# Patient Record
Sex: MALE | Race: BLACK OR AFRICAN AMERICAN | Employment: FULL TIME | URBAN - NONMETROPOLITAN AREA
[De-identification: names, ages, dates, MRNs, and addresses within clinical notes are randomized per-mention and may not be internally consistent; named-entity substitution may affect disease eponyms.]

---

## 2020-07-09 ENCOUNTER — HOSPITAL ENCOUNTER (EMERGENCY)
Age: 40
Discharge: HOME | End: 2020-07-09
Attending: EMERGENCY MEDICINE
Payer: COMMERCIAL

## 2020-07-09 VITALS
OXYGEN SATURATION: 97 % | SYSTOLIC BLOOD PRESSURE: 133 MMHG | TEMPERATURE: 98 F | HEART RATE: 68 BPM | DIASTOLIC BLOOD PRESSURE: 88 MMHG | RESPIRATION RATE: 16 BRPM

## 2020-07-09 PROCEDURE — 99203 OFFICE O/P NEW LOW 30 MIN: CPT | Performed by: EMERGENCY MEDICINE

## 2020-07-09 PROCEDURE — 99202 OFFICE O/P NEW SF 15 MIN: CPT

## 2020-07-09 ASSESSMENT — ENCOUNTER SYMPTOMS
DIARRHEA: 0
SHORTNESS OF BREATH: 0
EYE DISCHARGE: 0
TROUBLE SWALLOWING: 0
SORE THROAT: 0
VOICE CHANGE: 0
BACK PAIN: 0
EYE REDNESS: 0
SINUS PRESSURE: 0
ABDOMINAL PAIN: 0
WHEEZING: 0
EYE PAIN: 0
VOMITING: 0
STRIDOR: 0
COUGH: 0
NAUSEA: 0

## 2020-07-09 NOTE — ED PROVIDER NOTES
MEDICATIONS       Previous Medications    No medications on file       ALLERGIES     Patient is has No Known Allergies. FAMILY HISTORY     Patient'sfamily history includes High Blood Pressure in his father and mother. SOCIAL HISTORY     Patient  reports that he has been smoking cigars. He has never used smokeless tobacco. He reports current alcohol use. PHYSICAL EXAM     ED TRIAGE VITALS  BP: 133/88, Temp: 98 °F (36.7 °C), Pulse: 68, Resp: 16, SpO2: 97 %  Physical Exam  Vitals signs and nursing note reviewed. Constitutional:       Appearance: He is well-developed. Comments: Moist membranes, normal airway   HENT:      Head: Normocephalic and atraumatic. Right Ear: External ear normal.      Left Ear: External ear normal.      Nose: Nose normal. No congestion or rhinorrhea. Right Sinus: No maxillary sinus tenderness or frontal sinus tenderness. Left Sinus: No maxillary sinus tenderness or frontal sinus tenderness. Mouth/Throat:      Pharynx: No oropharyngeal exudate. Comments: Cavity normal.  Upper lip corner of mouth reveals what appears to be a pyogenic granuloma. No abscess. Eyes:      General: No scleral icterus. Right eye: No discharge. Left eye: No discharge. Extraocular Movements:      Right eye: Normal extraocular motion. Left eye: Normal extraocular motion. Conjunctiva/sclera: Conjunctivae normal.      Pupils: Pupils are equal, round, and reactive to light. Comments: Conjunctival clear   Neck:      Musculoskeletal: Normal range of motion. Thyroid: No thyromegaly. Vascular: No JVD. Comments: No meningismus  Cardiovascular:      Rate and Rhythm: Normal rate and regular rhythm. Pulses: Normal pulses. Heart sounds: Normal heart sounds, S1 normal and S2 normal. No murmur. No friction rub. No gallop. Pulmonary:      Effort: Pulmonary effort is normal. No tachypnea or respiratory distress.       Breath sounds: Normal breath sounds. No stridor. No decreased breath sounds, wheezing, rhonchi or rales. Comments: No Cough, lungs clear  Chest:      Chest wall: No tenderness. Abdominal:      General: Bowel sounds are normal. There is no distension. Palpations: Abdomen is soft. There is no mass. Tenderness: There is no abdominal tenderness. There is no guarding or rebound. Musculoskeletal: Normal range of motion. General: No tenderness. Comments: Joints and extremities normal   Lymphadenopathy:      Cervical: No cervical adenopathy. Right cervical: No superficial or deep cervical adenopathy. Left cervical: No superficial or deep cervical adenopathy. Skin:     General: Skin is warm and dry. Findings: No erythema or rash. Comments: Lesion corner of the mouth upper lip most consistent with pyogenic granuloma   Neurological:      Mental Status: He is alert and oriented to person, place, and time. Cranial Nerves: No cranial nerve deficit. Motor: No abnormal muscle tone. Coordination: Coordination normal.      Deep Tendon Reflexes: Reflexes are normal and symmetric. Reflexes normal.      Comments: Appropriate, no focal findings   Psychiatric:         Behavior: Behavior normal.         Thought Content: Thought content normal.         Judgment: Judgment normal.         DIAGNOSTIC RESULTS   Labs: No results found for this visit on 07/09/20. IMAGING:  No orders to display     URGENT CARE COURSE:     Vitals:    07/09/20 1640   BP: 133/88   Pulse: 68   Resp: 16   Temp: 98 °F (36.7 °C)   TempSrc: Infrared   SpO2: 97%       Medications - No data to display  PROCEDURES:  None  FINALIMPRESSION      1. Granuloma pyogenicum    2. Pyoderma (skin infection)    3. Tobacco use disorder        DISPOSITION/PLAN   DISPOSITION Decision To Discharge 07/09/2020 05:27:15 PM  Nontoxic, well-hydrated, normal airway. Patient has what appears to be pyogenic granuloma.   Will treat with

## 2020-07-09 NOTE — ED NOTES
Patient understood instructions verbally,  Follow up with PCP with any concerns,  Prescription, work excuse with patient. ambulated self to lobby,stable condition.       Esme Metz LPN  12/37/65 [id number]

## 2020-07-09 NOTE — LETTER
[address] [facility]  [address] [place] [address]  Phone: [phone]               July 9, 2020    Patient: Miryam Dean   YOB: 1980   Date of Visit: 7/9/2020       To Whom It May Concern:    Miryam Dean was seen and treated in our emergency department on 7/9/2020. He may return to work on 7/14/2020.   No work July 13, 2020      Sincerely,       Carlota Mccabe MD         Signature:__________________________________

## 2020-07-13 ENCOUNTER — OFFICE VISIT (OUTPATIENT)
Dept: FAMILY MEDICINE CLINIC | Age: 40
End: 2020-07-13
Payer: COMMERCIAL

## 2020-07-13 VITALS
TEMPERATURE: 97.3 F | SYSTOLIC BLOOD PRESSURE: 116 MMHG | OXYGEN SATURATION: 99 % | WEIGHT: 218.6 LBS | HEIGHT: 73 IN | DIASTOLIC BLOOD PRESSURE: 78 MMHG | RESPIRATION RATE: 10 BRPM | HEART RATE: 78 BPM | BODY MASS INDEX: 28.97 KG/M2

## 2020-07-13 PROCEDURE — 99203 OFFICE O/P NEW LOW 30 MIN: CPT | Performed by: STUDENT IN AN ORGANIZED HEALTH CARE EDUCATION/TRAINING PROGRAM

## 2020-07-13 PROCEDURE — 11310 SHAVE SKIN LESION 0.5 CM/<: CPT | Performed by: STUDENT IN AN ORGANIZED HEALTH CARE EDUCATION/TRAINING PROGRAM

## 2020-07-13 RX ORDER — LIDOCAINE HYDROCHLORIDE AND EPINEPHRINE 10; 10 MG/ML; UG/ML
20 INJECTION, SOLUTION INFILTRATION; PERINEURAL ONCE
Status: COMPLETED | OUTPATIENT
Start: 2020-07-13 | End: 2020-07-13

## 2020-07-13 RX ADMIN — LIDOCAINE HYDROCHLORIDE AND EPINEPHRINE 20 ML: 10; 10 INJECTION, SOLUTION INFILTRATION; PERINEURAL at 15:00

## 2020-07-13 SDOH — ECONOMIC STABILITY: TRANSPORTATION INSECURITY
IN THE PAST 12 MONTHS, HAS THE LACK OF TRANSPORTATION KEPT YOU FROM MEDICAL APPOINTMENTS OR FROM GETTING MEDICATIONS?: NO

## 2020-07-13 SDOH — ECONOMIC STABILITY: FOOD INSECURITY: WITHIN THE PAST 12 MONTHS, YOU WORRIED THAT YOUR FOOD WOULD RUN OUT BEFORE YOU GOT MONEY TO BUY MORE.: SOMETIMES TRUE

## 2020-07-13 SDOH — ECONOMIC STABILITY: FOOD INSECURITY: WITHIN THE PAST 12 MONTHS, THE FOOD YOU BOUGHT JUST DIDN'T LAST AND YOU DIDN'T HAVE MONEY TO GET MORE.: SOMETIMES TRUE

## 2020-07-13 SDOH — ECONOMIC STABILITY: INCOME INSECURITY: HOW HARD IS IT FOR YOU TO PAY FOR THE VERY BASICS LIKE FOOD, HOUSING, MEDICAL CARE, AND HEATING?: NOT VERY HARD

## 2020-07-13 SDOH — ECONOMIC STABILITY: TRANSPORTATION INSECURITY
IN THE PAST 12 MONTHS, HAS LACK OF TRANSPORTATION KEPT YOU FROM MEETINGS, WORK, OR FROM GETTING THINGS NEEDED FOR DAILY LIVING?: NO

## 2020-07-13 ASSESSMENT — ENCOUNTER SYMPTOMS
DIARRHEA: 0
SINUS PAIN: 0
SHORTNESS OF BREATH: 0
SINUS PRESSURE: 0
NAUSEA: 0
ABDOMINAL PAIN: 0
COUGH: 0
BACK PAIN: 0
CONSTIPATION: 0
VOMITING: 0
EYE PAIN: 0
SORE THROAT: 0
BLOOD IN STOOL: 0

## 2020-07-13 ASSESSMENT — PATIENT HEALTH QUESTIONNAIRE - PHQ9
SUM OF ALL RESPONSES TO PHQ9 QUESTIONS 1 & 2: 0
2. FEELING DOWN, DEPRESSED OR HOPELESS: 0
SUM OF ALL RESPONSES TO PHQ QUESTIONS 1-9: 0
1. LITTLE INTEREST OR PLEASURE IN DOING THINGS: 0
SUM OF ALL RESPONSES TO PHQ QUESTIONS 1-9: 0

## 2020-07-13 NOTE — PROGRESS NOTES
S: 41 y.o. male with   Chief Complaint   Patient presents with    Follow-up     ed- issue with growth on corner of mouth, denies pain       Tried to pop a pimple and it got worse - the urgent care gave bacitracin and it has not gotten smaller - but larger    It came on suddenly and does not remember using anything new - uses carmex - not irritated but some pain    No other symptoms    Smokes cigars    BP Readings from Last 3 Encounters:   07/13/20 116/78   07/09/20 133/88     Wt Readings from Last 3 Encounters:   07/13/20 218 lb 9.6 oz (99.2 kg)           O: VS:   Vitals:    07/13/20 1336   BP: 116/78   Site: Left Upper Arm   Position: Sitting   Cuff Size: Large Adult   Pulse: 78   Resp: 10   Temp: 97.3 °F (36.3 °C)   TempSrc: Temporal   SpO2: 99%   Weight: 218 lb 9.6 oz (99.2 kg)   Height: 6' 1\" (1.854 m)     Body mass index is 28.84 kg/m². AAO/NAD, appropriate affect for mood  Normocephalic, atraumatic, eyes - conjunctiva and sclera normal,   skin no rashes on exposed areas   Insight, judgement normal and in no acute distress      No results found for: WBC, HGB, HCT, PLT, CHOL, TRIG, HDL, LDLDIRECT, LDLCALC, AST, NA, K, CL, CREATININE, BUN, CO2, TSH, PSA, INR, GLUF, LABA1C, LABMICR, LABGLOM, MG, CALCIUM, VITD25    No results found. Diagnosis Orders   1. Lesion of lip  Basic Metabolic Panel    CBC With Auto Differential    lidocaine-EPINEPHrine 1 percent-1:953063 injection 20 mL    Surgical Pathology    [address] - [place] SHAV SKIN LES <5MM FACE,FACIAL   2. Encounter for screening for lipoid disorders  Lipid Panel   3.  Screening for HIV without presence of risk factors  HIV-1 and HIV-2 Antibodies       Plan  Will bring you back for the new patient and go over labs    Can do a cbc, hiv, cholesterol and liver and kidneys    Observed the entire procedure with the resident Tessa Fernandez - minimal bleeding applied aluminum chloride to help the healing and bleeding    Suspected wart will send for pathology Return in about 1 month (around 8/13/2020) for new patient discuss HM, labs. Orders Placed:  Orders Placed This Encounter   Procedures    Lipid Panel    HIV-1 and HIV-2 Antibodies    Basic Metabolic Panel    CBC With Auto Differential    Surgical Pathology    [address] - [place] SHAV SKIN LES <5MM FACE,FACIAL     Medications Prescribed:  Orders Placed This Encounter   Medications    lidocaine-EPINEPHrine 1 percent-1:906629 injection 20 mL       Future Appointments   Date Time Provider Gisela Lorena   8/20/2020  4:10 PM Bobby Chandra MD [address] [facility] Maintenance Due   Topic Date Due    Pneumococcal 0-64 years Vaccine (1 of 1 - PPSV23) 05/30/1984    HIV screen  05/30/1993    DTaP/Tdap/Td vaccine (1 - Tdap) 05/30/1997    Lipid screen  05/30/2018    Diabetes screen  05/30/2018         Attending Physician Statement  I have discussed the case, including pertinent history and exam findings with the resident. I also have seen the patient and performed key portions of the examination. I agree with the documented assessment and plan as documented by the resident.   GE modifier added to this encounter      Yao Em DO 7/13/2020 5:07 PM

## 2020-07-13 NOTE — PATIENT INSTRUCTIONS
Thank you   1. Thank you for trusting us with your healthcare needs. You may receive a survey regarding today's visit. It would help us out if you would take a few moments to provide your feedback. We value your input. 2. Please bring in ALL medications BOTTLES, including inhalers, herbal supplements, over the counter, prescribed & non-prescribed medicine. The office would like actual medication bottles and a list.   3. Please note our OFFICE POLICIES:   a. Prior to getting your labs drawn, please check with your insurance company for benefits and eligibility of lab services. Often, insurance companies cover certain tests for preventative visits only. It is patient's responsibility to see what is covered. b. We are unable to change a diagnosis after the test has been performed. c. Lab orders will not be re-printed. Please hold onto your original lab orders and take them to your lab to be completed. d. If you no show your scheduled appointment three times, you will be dismissed from this practice. e. Suezamye Jenna must be completed 24 hours prior to your schedule appointment. 4. If the list below has been completed, PLEASE FAX RECORDS TO OUR OFFICE @ [phone]. Once the records have been received we will update your records at our office:  Health Maintenance Due   Topic Date Due    Pneumococcal 0-64 years Vaccine (1 of 1 - PPSV23) 05/30/1984    HIV screen  05/30/1993    DTaP/Tdap/Td vaccine (1 - Tdap) 05/30/1997    Lipid screen  05/30/2018             Patient Education        Stopping Smokeless Tobacco Use: Care Instructions  Your Care Instructions     Smokeless tobacco comes in many forms, such as snuff and chewing tobacco:  · Snuff is finely ground tobacco sold in cans or pouches. Most of the time, snuff is used by putting a \"pinch\" or \"dip\" between the lower lip or cheek and the gum. · Chewing tobacco is sold as loose leaves, plugs, or twists.  It is chewed or placed between the cheek and the gum feel better. Follow-up care is a key part of your treatment and safety. Be sure to make and go to all appointments, and call your doctor if you are having problems. It's also a good idea to know your test results and keep a list of the medicines you take. How can you care for yourself at home? · Ask your family, friends, and coworkers for support. You have a better chance of quitting if you have help and support. · Join a support group for people who are trying to quit using smokeless tobacco.  · Set a quit date. Pick your date carefully so that it is not right in the middle of a big deadline or stressful time. After you quit, do not use smokeless tobacco even once. Get rid of all spit cups, cans, and pouches after your last use. Clean your house and your clothes so that they do not smell of tobacco.  · Learn how to be a non-user. Think about ways you can avoid those things that make you reach for tobacco.  ? Learn some ways to deal with cravings, like calling a friend or going for a walk. Cravings often pass. ? Avoid situations that put you at greatest risk for using smokeless tobacco. For some people, it is hard to spend time with friends without dipping or chewing. For others, they might skip a coffee break with coworkers who smoke or use smokeless tobacco.  ? Change your daily routine. Take a different route to work, or eat a meal in a different place. · Cut down on stress. Calm yourself or release tension by doing an activity you enjoy, such as reading a book, taking a hot bath, or gardening. · Talk to your doctor or pharmacist about nicotine replacement therapy. You still get nicotine, but you do not use tobacco. Nicotine replacement products help you slowly reduce the amount of nicotine you need. Many of these products are available over the counter. They include nicotine patches, gum, lozenges, and inhalers.   · Ask your doctor about bupropion (Wellbutrin) or varenicline (Chantix), which are

## 2020-07-13 NOTE — PROGRESS NOTES
[address] [address] [facility] [address]  Dept: [phone]  Dept Fax: [phone]  Loc: [phone]    Nely Yoder is a 41 y.o. male who presents today for:  Chief Complaint   Patient presents with    Follow-up     ed- issue with growth on corner of mouth, denies pain       HPI:   New patient, here to establish care today. Follow-up from ED. Lesion on the corner of his mouth, started suddenly about 2 weeks ago. Tried to pop it, started bleeding. Tried using alcohol and hydrogen peroxide at home. Went to  and they just gave him Bactroban ointment. Does not hurt, but feels it is still growing. Denies fever, rashes, other oral lesions. Denies any PMHx, Surgican Hx, allergies. Reports he smokes cigars, denies any tobacco use. No past medical history on file. No past surgical history on file. Family History   Problem Relation Age of Onset    High Blood Pressure Mother     High Blood Pressure Father      Social History     Tobacco Use    Smoking status: Current Some Day Smoker     Types: Cigars    Smokeless tobacco: Never Used    Tobacco comment: occ   Substance Use Topics    Alcohol use: Yes     Comment: occ      Current Outpatient Medications   Medication Sig Dispense Refill    mupirocin (BACTROBAN) 2 % ointment Apply topically 3 times daily.  22 g 0     Current Facility-Administered Medications   Medication Dose Route Frequency Provider Last Rate Last Dose    lidocaine-EPINEPHrine 1 percent-1:157569 injection 20 mL  20 mL Other Once Cindy Bustamante MD         No Known Allergies    Health Maintenance   Topic Date Due    Pneumococcal 0-64 years Vaccine (1 of 1 - PPSV23) 05/30/1984    HIV screen  05/30/1993    DTaP/Tdap/Td vaccine (1 - Tdap) 05/30/1997    Lipid screen  05/30/2018    Diabetes screen  05/30/2018    Flu vaccine (1) 09/01/2020    Hepatitis A vaccine  Aged Out    Hepatitis B vaccine  Aged Out    Hib vaccine  Aged Out    Meningococcal (ACWY) vaccine  Aged Out       Subjective:      Review of Systems   Constitutional: Negative for fatigue and fever. HENT: Negative for sinus pressure, sinus pain and sore throat. Eyes: Negative for pain and visual disturbance. Respiratory: Negative for cough and shortness of breath. Cardiovascular: Negative for chest pain and palpitations. Gastrointestinal: Negative for abdominal pain, blood in stool, constipation, diarrhea, nausea and vomiting. Genitourinary: Negative for difficulty urinating and hematuria. Musculoskeletal: Negative for back pain and joint swelling. Skin: Positive for wound. Negative for rash. Neurological: Negative for dizziness, light-headedness and headaches. Psychiatric/Behavioral: Negative for confusion and sleep disturbance. Objective:     Vitals:    07/13/20 1336   BP: 116/78   Site: Left Upper Arm   Position: Sitting   Cuff Size: Large Adult   Pulse: 78   Resp: 10   Temp: 97.3 °F (36.3 °C)   TempSrc: Temporal   SpO2: 99%   Weight: 218 lb 9.6 oz (99.2 kg)   Height: 6' 1\" (1.854 m)       Body mass index is 28.84 kg/m². Wt Readings from Last 3 Encounters:   07/13/20 218 lb 9.6 oz (99.2 kg)     BP Readings from Last 3 Encounters:   07/13/20 116/78   07/09/20 133/88       Physical Exam  Vitals signs and nursing note reviewed. Constitutional:       General: He is not in acute distress. Appearance: Normal appearance. He is well-developed. He is not diaphoretic. HENT:      Head: Normocephalic and atraumatic. Right Ear: External ear normal.      Left Ear: External ear normal.      Nose: Nose normal.      Mouth/Throat:      Mouth: Mucous membranes are moist.   Eyes:      General: No scleral icterus. Right eye: No discharge. Left eye: No discharge. Conjunctiva/sclera: Conjunctivae normal.      Pupils: Pupils are equal, round, and reactive to light.    Neck: Musculoskeletal: Normal range of motion. Cardiovascular:      Rate and Rhythm: Normal rate and regular rhythm. Pulses: Normal pulses. Heart sounds: Normal heart sounds. No murmur. No friction rub. Pulmonary:      Effort: Pulmonary effort is normal. No respiratory distress. Breath sounds: Normal breath sounds. No wheezing. Abdominal:      General: Bowel sounds are normal. There is no distension. Palpations: Abdomen is soft. There is no mass. Tenderness: There is no abdominal tenderness. Musculoskeletal: Normal range of motion. Right lower leg: No edema. Left lower leg: No edema. Skin:     General: Skin is warm and dry. Findings: Lesion present. No erythema or rash. Comments: Lower right lip with pedunculated lesion   Neurological:      Mental Status: He is alert and oriented to person, place, and time. Cranial Nerves: No cranial nerve deficit. Psychiatric:         Mood and Affect: Mood normal.         Behavior: Behavior normal.         Thought Content: Thought content normal.         Judgment: Judgment normal.               No results found for: WBC, HGB, HCT, PLT, CHOL, TRIG, HDL, LDLDIRECT, LDLCALC, AST, NA, K, CL, CREATININE, BUN, CO2, TSH, PSA, INR, GLUF, LABA1C, LABMICR, LABGLOM, MG, CALCIUM, VITD25    Imaging Results:    No results found. Assessment / Plan:     Dominick Crandall was seen today for follow-up. Diagnoses and all orders for this visit:    Lesion of lip  -     Basic Metabolic Panel; Future  -     CBC With Auto Differential; Future  -     lidocaine-EPINEPHrine 1 percent-1:252642 injection 20 mL  -     Surgical Pathology  -     [address]36 - [place] SHAV SKIN LES <5MM FACE,FACIAL    Excision of Lesion Procedure Note    Lesions: 1    Procedure: The lesion was cleaned with alcohol and draped in the usual way. Under sterile conditions 1% lidocaine with epinephrine was used for local anesthesia.   A number 11 scalpel was used to excise the lesion with 5mm margins and full thickness. The margins were undermined to obtain good approximation of the margins. The wound was cleansed with peroxide and alcohol and hemostasis achieved. No sutures were placed with good wound tension. The wound was cleansed with alcohol and dressed in a pressure dressing. No complications occurred and the patient tolerated the procedure well. The patient was instructed on wound care. Encounter for screening for lipoid disorders  -     Lipid Panel; Future    Screening for HIV without presence of risk factors  -     HIV-1 and HIV-2 Antibodies; Future      Lip lesion removed in office today, will send to Pathology  Basic labs for next visit  Discussed HM - will elaborate at follow-up next month       Return in about 1 month (around 8/13/2020) for new patient discuss HM, labs. Medications Prescribed:  Orders Placed This Encounter   Medications    lidocaine-EPINEPHrine 1 percent-1:888578 injection 20 mL       Future Appointments   Date Time Provider Gisela Carrillo   7/13/2020  3:40 PM Theresa Medrano MD [address]   8/20/2020  4:10 PM Theresa Medrano MD [address]       Patient given educational materials - see patient instructions. Discussed use, benefit, and sideeffects of prescribed medications. All patient questions answered. Pt voiced understanding. Reviewed health maintenance. Instructed to continue current medications, diet and exercise. Patient agreed with treatment plan. Follow up as directed.      Electronically signed by Theresa Medrano MD on 7/13/2020 at 2:49 PM

## 2020-07-30 ENCOUNTER — TELEPHONE (OUTPATIENT)
Dept: FAMILY MEDICINE CLINIC | Age: 40
End: 2020-07-30

## 2020-07-30 NOTE — TELEPHONE ENCOUNTER
Dr. Katharina Foster -    Patient stated that his lip still has a bump to it and is red. Also feels hard. It does not hurt and did not mention that it was warm to touch. Please Advise. Thanks!   Mc

## 2020-08-07 ENCOUNTER — OFFICE VISIT (OUTPATIENT)
Dept: FAMILY MEDICINE CLINIC | Age: 40
End: 2020-08-07
Payer: COMMERCIAL

## 2020-08-07 ENCOUNTER — NURSE ONLY (OUTPATIENT)
Dept: LAB | Age: 40
End: 2020-08-07

## 2020-08-07 VITALS
HEIGHT: 73 IN | SYSTOLIC BLOOD PRESSURE: 126 MMHG | DIASTOLIC BLOOD PRESSURE: 84 MMHG | OXYGEN SATURATION: 97 % | HEART RATE: 64 BPM | WEIGHT: 216 LBS | TEMPERATURE: 97.2 F | BODY MASS INDEX: 28.63 KG/M2

## 2020-08-07 LAB
ANION GAP SERPL CALCULATED.3IONS-SCNC: 9 MEQ/L (ref 8–16)
BASOPHILS # BLD: 0.3 %
BASOPHILS ABSOLUTE: 0 THOU/MM3 (ref 0–0.1)
BUN BLDV-MCNC: 15 MG/DL (ref 7–22)
CALCIUM SERPL-MCNC: 9.3 MG/DL (ref 8.5–10.5)
CHLORIDE BLD-SCNC: 105 MEQ/L (ref 98–111)
CHOLESTEROL, TOTAL: 210 MG/DL (ref 100–199)
CO2: 26 MEQ/L (ref 23–33)
CREAT SERPL-MCNC: 1.1 MG/DL (ref 0.4–1.2)
EOSINOPHIL # BLD: 2.8 %
EOSINOPHILS ABSOLUTE: 0.1 THOU/MM3 (ref 0–0.4)
ERYTHROCYTE [DISTWIDTH] IN BLOOD BY AUTOMATED COUNT: 12.4 % (ref 11.5–14.5)
ERYTHROCYTE [DISTWIDTH] IN BLOOD BY AUTOMATED COUNT: 40 FL (ref 35–45)
GFR SERPL CREATININE-BSD FRML MDRD: 73 ML/MIN/1.73M2
GLUCOSE BLD-MCNC: 105 MG/DL (ref 70–108)
HCT VFR BLD CALC: 42.9 % (ref 42–52)
HDLC SERPL-MCNC: 65 MG/DL
HEMOGLOBIN: 14.4 GM/DL (ref 14–18)
IMMATURE GRANS (ABS): 0 THOU/MM3 (ref 0–0.07)
IMMATURE GRANULOCYTES: 0 %
LDL CHOLESTEROL CALCULATED: 127 MG/DL
LYMPHOCYTES # BLD: 43.5 %
LYMPHOCYTES ABSOLUTE: 1.4 THOU/MM3 (ref 1–4.8)
MCH RBC QN AUTO: 29.6 PG (ref 26–33)
MCHC RBC AUTO-ENTMCNC: 33.6 GM/DL (ref 32.2–35.5)
MCV RBC AUTO: 88.1 FL (ref 80–94)
MONOCYTES # BLD: 10.1 %
MONOCYTES ABSOLUTE: 0.3 THOU/MM3 (ref 0.4–1.3)
NUCLEATED RED BLOOD CELLS: 0 /100 WBC
PLATELET # BLD: 186 THOU/MM3 (ref 130–400)
PMV BLD AUTO: 11.6 FL (ref 9.4–12.4)
POTASSIUM SERPL-SCNC: 4 MEQ/L (ref 3.5–5.2)
RBC # BLD: 4.87 MILL/MM3 (ref 4.7–6.1)
SEG NEUTROPHILS: 43.3 %
SEGMENTED NEUTROPHILS ABSOLUTE COUNT: 1.4 THOU/MM3 (ref 1.8–7.7)
SODIUM BLD-SCNC: 140 MEQ/L (ref 135–145)
TRIGL SERPL-MCNC: 90 MG/DL (ref 0–199)
WBC # BLD: 3.2 THOU/MM3 (ref 4.8–10.8)

## 2020-08-07 PROCEDURE — 99212 OFFICE O/P EST SF 10 MIN: CPT | Performed by: STUDENT IN AN ORGANIZED HEALTH CARE EDUCATION/TRAINING PROGRAM

## 2020-08-07 ASSESSMENT — ENCOUNTER SYMPTOMS
BACK PAIN: 0
BLOOD IN STOOL: 0
NAUSEA: 0
SINUS PAIN: 0
CONSTIPATION: 0
SORE THROAT: 0
EYE PAIN: 0
ABDOMINAL PAIN: 0
SINUS PRESSURE: 0
VOMITING: 0
COUGH: 0
SHORTNESS OF BREATH: 0
DIARRHEA: 0

## 2020-08-07 NOTE — PROGRESS NOTES
S: 41 y.o. male with   Chief Complaint   Patient presents with    Follow-up     LIP       The lip lesion is growing back again    Here for pathology    BP Readings from Last 3 Encounters:   08/07/20 126/84   07/13/20 116/78   07/09/20 133/88     Wt Readings from Last 3 Encounters:   08/07/20 216 lb (98 kg)   07/13/20 218 lb 9.6 oz (99.2 kg)           O: VS:   Vitals:    08/07/20 1041   BP: 126/84   Pulse: 64   Temp: 97.2 °F (36.2 °C)   TempSrc: Temporal   SpO2: 97%   Weight: 216 lb (98 kg)   Height: 6' 1\" (1.854 m)     Body mass index is 28.5 kg/m². AAO/NAD, appropriate affect for mood  Normocephalic, atraumatic, eyes - conjunctiva and sclera normal,   skin no rashes on exposed areas   Insight, judgement normal and in no acute distress    Dermatoscope shows red lesion with blood vessels noted 3 to 4 mm at corner of right lower lip    No results found for: WBC, HGB, HCT, PLT, CHOL, TRIG, HDL, LDLDIRECT, LDLCALC, AST, NA, K, CL, CREATININE, BUN, CO2, TSH, PSA, INR, GLUF, LABA1C, LABMICR, LABGLOM, MG, CALCIUM, VITD25    No results found. Diagnosis Orders   1. Hemangioma of lip  Quyen Mccarthy MD, Plastic Surgery, [facility]  Will refer to plastics - suspect hemangioma - and will need to do more at the base of the lesion so it will  Not grow back       Return in about 1 month (around 9/7/2020). Orders Placed:  Orders Placed This Encounter   Procedures   Suzy Rosa MD, [facility] [place]     Medications Prescribed:  No orders of the defined types were placed in this encounter.       Future Appointments   Date Time Provider Gisela Carrillo   8/7/2020 11:30 AM SCHEDULE, [facility] RM 1 [facility] [address] [address]   8/27/2020  2:20 PM Mili Ferrer MD [address] [facility] - [facility]el[facility] Thompson Maintenance Due   Topic Date Due    Pneumococcal 0-64 years Vaccine (1 of 1 - PPSV23) 05/30/1984    HIV screen  05/30/1993    DTaP/Tdap/Td vaccine (1 - Tdap) 05/30/1997    Lipid screen

## 2020-08-07 NOTE — PATIENT INSTRUCTIONS
Thank you   1. Thank you for trusting us with your healthcare needs. You may receive a survey regarding today's visit. It would help us out if you would take a few moments to provide your feedback. We value your input. 2. Please bring in ALL medications BOTTLES, including inhalers, herbal supplements, over the counter, prescribed & non-prescribed medicine. The office would like actual medication bottles and a list.   3. Please note our OFFICE POLICIES:   a. Prior to getting your labs drawn, please check with your insurance company for benefits and eligibility of lab services. Often, insurance companies cover certain tests for preventative visits only. It is patient's responsibility to see what is covered. b. We are unable to change a diagnosis after the test has been performed. c. Lab orders will not be re-printed. Please hold onto your original lab orders and take them to your lab to be completed. d. If you no show your scheduled appointment three times, you will be dismissed from this practice. e. Ralf Jessica must be completed 24 hours prior to your schedule appointment. 4. If the list below has been completed, PLEASE FAX RECORDS TO OUR OFFICE @ [phone]. Once the records have been received we will update your records at our office:  Health Maintenance Due   Topic Date Due    Pneumococcal 0-64 years Vaccine (1 of 1 - PPSV23) 05/30/1984    HIV screen  05/30/1993    DTaP/Tdap/Td vaccine (1 - Tdap) 05/30/1997    Lipid screen  05/30/2018    Diabetes screen  05/30/2018           Thank you   4. Thank you for trusting us with your healthcare needs. You may receive a survey regarding today's visit. It would help us out if you would take a few moments to provide your feedback. We value your input. 5. Please bring in ALL medications BOTTLES, including inhalers, herbal supplements, over the counter, prescribed & non-prescribed medicine.  The office would like actual medication bottles and a list. 6. Please note our OFFICE POLICIES:   f. Prior to getting your labs drawn, please check with your insurance company for benefits and eligibility of lab services. Often, insurance companies cover certain tests for preventative visits only. It is patient's responsibility to see what is covered. g. We are unable to change a diagnosis after the test has been performed.   h. Lab orders will not be re-printed. Please hold onto your original lab orders and take them to your lab to be completed. i. If you no show your scheduled appointment three times, you will be dismissed from this practice.   Maldonado Melena must be completed 24 hours prior to your schedule appointment. 5. If the list below has been completed, PLEASE FAX RECORDS TO OUR OFFICE @ [phone].  Once the records have been received we will update your records at our office:  Health Maintenance Due   Topic Date Due    Pneumococcal 0-64 years Vaccine (1 of 1 - PPSV23) 05/30/1984    HIV screen  05/30/1993    DTaP/Tdap/Td vaccine (1 - Tdap) 05/30/1997    Lipid screen  05/30/2018    Diabetes screen  05/30/2018

## 2020-08-07 NOTE — PROGRESS NOTES
[id number] [facility] [place] [address] [address]  Dept: [phone]  Dept Fax: [phone]  Loc: [phone]    Balta Lara is a 41 y.o. male who presents today for:  Chief Complaint   Patient presents with    Follow-up     LIP       HPI:   Here for follow-up for lip lesion. Excised at the last appt, had some bleeding afterwards but stopped. Denies pain to the lesion. States that the lesion is just ugly to look at and would like to have it removed. Lesion is now red. Surgical pathology only revealed inflammatory debris. Denies any history of herpes. No past medical history on file. No past surgical history on file. Family History   Problem Relation Age of Onset    High Blood Pressure Mother     High Blood Pressure Father      Social History     Tobacco Use    Smoking status: Current Some Day Smoker     Types: Cigars    Smokeless tobacco: Never Used    Tobacco comment: occ   Substance Use Topics    Alcohol use: Yes     Comment: occ      No current outpatient medications on file. No current facility-administered medications for this visit. No Known Allergies    Health Maintenance   Topic Date Due    Pneumococcal 0-64 years Vaccine (1 of 1 - PPSV23) 05/30/1984    HIV screen  05/30/1993    DTaP/Tdap/Td vaccine (1 - Tdap) 05/30/1997    Lipid screen  05/30/2018    Diabetes screen  05/30/2018    Flu vaccine (1) 09/01/2020    Hepatitis A vaccine  Aged Out    Hepatitis B vaccine  Aged Out    Hib vaccine  Aged Out    Meningococcal (ACWY) vaccine  Aged Out       Subjective:      Review of Systems   Constitutional: Negative for fatigue and fever. HENT: Negative for sinus pressure, sinus pain and sore throat. Eyes: Negative for pain and visual disturbance. Respiratory: Negative for cough and shortness of breath. Cardiovascular: Negative for chest pain and palpitations.    Gastrointestinal: Negative for abdominal pain, blood in stool, constipation, diarrhea, nausea and vomiting. Genitourinary: Negative for difficulty urinating and hematuria. Musculoskeletal: Negative for back pain and joint swelling. Skin: Negative for rash. Neurological: Negative for dizziness, light-headedness and headaches. Psychiatric/Behavioral: Negative for confusion and sleep disturbance. Objective:     Vitals:    08/07/20 1041   BP: 126/84   Pulse: 64   Temp: 97.2 °F (36.2 °C)   TempSrc: Temporal   SpO2: 97%   Weight: 216 lb (98 kg)   Height: 6' 1\" (1.854 m)       Body mass index is 28.5 kg/m². Wt Readings from Last 3 Encounters:   08/07/20 216 lb (98 kg)   07/13/20 218 lb 9.6 oz (99.2 kg)     BP Readings from Last 3 Encounters:   08/07/20 126/84   07/13/20 116/78   07/09/20 133/88       Physical Exam  Vitals signs and nursing note reviewed. Constitutional:       General: He is not in acute distress. Appearance: Normal appearance. He is well-developed. He is not diaphoretic. HENT:      Head: Normocephalic and atraumatic. Right Ear: External ear normal.      Left Ear: External ear normal.      Nose: Nose normal.      Mouth/Throat:      Mouth: Mucous membranes are moist.   Eyes:      General: No scleral icterus. Right eye: No discharge. Left eye: No discharge. Conjunctiva/sclera: Conjunctivae normal.      Pupils: Pupils are equal, round, and reactive to light. Neck:      Musculoskeletal: Normal range of motion. Cardiovascular:      Rate and Rhythm: Normal rate and regular rhythm. Heart sounds: No murmur. No friction rub. Pulmonary:      Effort: Pulmonary effort is normal. No respiratory distress. Breath sounds: No wheezing. Abdominal:      General: There is no distension. Palpations: There is no mass. Tenderness: There is no abdominal tenderness. Musculoskeletal: Normal range of motion. Right lower leg: No edema.       Left lower leg: No edema.   Skin:     General: Skin is warm and dry. Findings: Lesion present. No erythema or rash. Comments: Lip lesion - corner of bottom right lip, vascular in nature. Appears to be a hemangioma    Neurological:      Mental Status: He is alert and oriented to person, place, and time. Cranial Nerves: No cranial nerve deficit. Psychiatric:         Mood and Affect: Mood normal.         Behavior: Behavior normal.         Thought Content: Thought content normal.         Judgment: Judgment normal.         Lab Results   Component Value Date    WBC 3.2 (L) 08/07/2020    HGB 14.4 08/07/2020    HCT 42.9 08/07/2020     08/07/2020    CHOL 210 (H) 08/07/2020    TRIG 90 08/07/2020    HDL 65 08/07/2020    LDLCALC 127 08/07/2020     08/07/2020    K 4.0 08/07/2020     08/07/2020    CREATININE 1.1 08/07/2020    BUN 15 08/07/2020    CO2 26 08/07/2020    LABGLOM 73 (A) 08/07/2020    CALCIUM 9.3 08/07/2020       Imaging Results:    No results found. Assessment / Plan:     Marietta Lawrence was seen today for follow-up. Diagnoses and all orders for this visit:    Hemangioma of lip:  - Will refer to Plastic Surgery for removal of hemangioma  -     Phill Monroe MD, Plastic Surgery, [facility]         Return in about 1 month (around 9/7/2020). Medications Prescribed:  No orders of the defined types were placed in this encounter. Future Appointments   Date Time Provider Gisela Carrillo   8/27/2020  2:20 PM Bobby Chandra MD [facility]       Patient given educational materials - see patient instructions. Discussed use, benefit, and sideeffects of prescribed medications. All patient questions answered. Pt voiced understanding. Reviewed health maintenance. Instructed to continue current medications, diet and exercise. Patient agreed with treatment plan. Follow up as directed.      Electronically signed by Bobby Chandra MD on 8/7/2020 at 5:53 PM

## 2020-08-07 NOTE — PROGRESS NOTES
Health Maintenance Due   Topic Date Due    Pneumococcal 0-64 years Vaccine (1 of 1 - PPSV23) 05/30/1984REFUSED    HIV screen  05/30/1993ORDERED    DTaP/Tdap/Td vaccine (1 - Tdap) 05/30/1997REFUSED    Lipid screen  05/30/2018ORDERED    Diabetes screen  05/30/2018ORDERED

## 2020-08-08 LAB — HIV-2 AB: NEGATIVE

## 2020-08-11 ENCOUNTER — TELEPHONE (OUTPATIENT)
Dept: FAMILY MEDICINE CLINIC | Age: 40
End: 2020-08-11

## 2020-08-18 ENCOUNTER — OFFICE VISIT (OUTPATIENT)
Dept: SURGERY | Age: 40
End: 2020-08-18
Payer: COMMERCIAL

## 2020-08-18 VITALS
TEMPERATURE: 97 F | BODY MASS INDEX: 28.2 KG/M2 | SYSTOLIC BLOOD PRESSURE: 148 MMHG | HEIGHT: 73 IN | HEART RATE: 76 BPM | DIASTOLIC BLOOD PRESSURE: 96 MMHG | WEIGHT: 212.8 LBS

## 2020-08-18 PROCEDURE — 11106 INCAL BX SKN SINGLE LES: CPT | Performed by: SURGERY

## 2020-08-18 PROCEDURE — 99205 OFFICE O/P NEW HI 60 MIN: CPT | Performed by: SURGERY

## 2020-08-18 RX ORDER — LIDOCAINE HYDROCHLORIDE AND EPINEPHRINE BITARTRATE 20; .01 MG/ML; MG/ML
0.5 INJECTION, SOLUTION SUBCUTANEOUS ONCE
Status: COMPLETED | OUTPATIENT
Start: 2020-08-18 | End: 2020-08-18

## 2020-08-18 RX ADMIN — LIDOCAINE HYDROCHLORIDE AND EPINEPHRINE BITARTRATE 0.5 ML: 20; .01 INJECTION, SOLUTION SUBCUTANEOUS at 15:34

## 2020-08-27 ENCOUNTER — OFFICE VISIT (OUTPATIENT)
Dept: FAMILY MEDICINE CLINIC | Age: 40
End: 2020-08-27
Payer: COMMERCIAL

## 2020-08-27 VITALS
RESPIRATION RATE: 14 BRPM | SYSTOLIC BLOOD PRESSURE: 108 MMHG | BODY MASS INDEX: 27.73 KG/M2 | DIASTOLIC BLOOD PRESSURE: 82 MMHG | WEIGHT: 210.2 LBS | OXYGEN SATURATION: 98 % | TEMPERATURE: 96.8 F | HEART RATE: 69 BPM

## 2020-08-27 LAB — HBA1C MFR BLD: 5.6 % (ref 4.3–5.7)

## 2020-08-27 PROCEDURE — 99213 OFFICE O/P EST LOW 20 MIN: CPT | Performed by: STUDENT IN AN ORGANIZED HEALTH CARE EDUCATION/TRAINING PROGRAM

## 2020-08-27 ASSESSMENT — ENCOUNTER SYMPTOMS
CONSTIPATION: 0
SORE THROAT: 0
SINUS PAIN: 0
EYE PAIN: 0
BACK PAIN: 0
ABDOMINAL PAIN: 0
DIARRHEA: 0
VOMITING: 0
BLOOD IN STOOL: 0
NAUSEA: 0
SHORTNESS OF BREATH: 0
SINUS PRESSURE: 0
COUGH: 0

## 2020-08-27 NOTE — PROGRESS NOTES
[address] [place] [address] [address]  Dept: [phone]  Dept Fax: [phone]  Loc: [phone]    Silvia Ochoa is a 41 y.o. male who presents today for:  Chief Complaint   Patient presents with    Follow-up    Pain     in both elbows x2 weeks. Doesnt remember any injury. HPI:   Had hemangioma removed by Plastic Surgery. Path results reviewed with patient. Has not grown back. Has bilateral elbow soreness/pain for the past week. Started suddenly, intermittent. No numbness or tingling noted. Will have tenderness over elbows. Has tried [employer] to help. 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 Denies any injury or trauma. Left elbow worse than right elbow. Otherwise doing well, no complaints. History reviewed. No pertinent past medical history. History reviewed. No pertinent surgical history. Family History   Problem Relation Age of Onset    High Blood Pressure Mother     High Blood Pressure Father      Social History     Tobacco Use    Smoking status: Current Some Day Smoker     Types: Cigars    Smokeless tobacco: Never Used    Tobacco comment: occ   Substance Use Topics    Alcohol use: Yes     Comment: occ      No current outpatient medications on file. No current facility-administered medications for this visit.       No Known Allergies    Health Maintenance   Topic Date Due    Diabetes screen  05/30/2018    DTaP/Tdap/Td vaccine (1 - Tdap) 08/27/2021 (Originally 5/30/1997)    Pneumococcal 0-64 years Vaccine (1 of 1 - PPSV23) 08/27/2021 (Originally 5/30/1984)    Flu vaccine (1) 09/01/2020    Lipid screen  08/07/2025    HIV screen  Completed    Hepatitis A vaccine  Aged Out    Hepatitis B vaccine  Aged Out    Hib vaccine  Aged Out    Meningococcal (ACWY) vaccine  Aged Out       Subjective:      Review of Systems   Constitutional: Negative for fatigue and fever.   HENT: Negative for sinus pressure, sinus pain and sore throat. Eyes: Negative for pain and visual disturbance. Respiratory: Negative for cough and shortness of breath. Cardiovascular: Negative for chest pain and palpitations. Gastrointestinal: Negative for abdominal pain, blood in stool, constipation, diarrhea, nausea and vomiting. Genitourinary: Negative for difficulty urinating and hematuria. Musculoskeletal: Negative for back pain and joint swelling. Elbow pain   Skin: Negative for rash. Neurological: Negative for dizziness, light-headedness and headaches. Psychiatric/Behavioral: Negative for confusion and sleep disturbance. Objective:     Vitals:    08/27/20 1412   BP: 108/82   Site: Right Upper Arm   Pulse: 69   Resp: 14   Temp: 96.8 °F (36 °C)   TempSrc: Temporal   SpO2: 98%   Weight: 210 lb 3.2 oz (95.3 kg)       Body mass index is 27.73 kg/m². Wt Readings from Last 3 Encounters:   08/27/20 210 lb 3.2 oz (95.3 kg)   08/18/20 212 lb 12.8 oz (96.5 kg)   08/07/20 216 lb (98 kg)     BP Readings from Last 3 Encounters:   08/27/20 108/82   08/18/20 (!) 148/96   08/07/20 126/84       Physical Exam  Vitals signs and nursing note reviewed. Constitutional:       General: He is not in acute distress. Appearance: Normal appearance. He is well-developed. He is not diaphoretic. HENT:      Head: Normocephalic and atraumatic. Right Ear: External ear normal.      Left Ear: External ear normal.      Nose: Nose normal.      Mouth/Throat:      Mouth: Mucous membranes are moist.   Eyes:      General: No scleral icterus. Right eye: No discharge. Left eye: No discharge. Conjunctiva/sclera: Conjunctivae normal.      Pupils: Pupils are equal, round, and reactive to light. Neck:      Musculoskeletal: Normal range of motion. Cardiovascular:      Rate and Rhythm: Normal rate and regular rhythm. Pulses: Normal pulses.       Heart sounds: Normal heart sounds. No murmur. No friction rub. Pulmonary:      Effort: Pulmonary effort is normal. No respiratory distress. Breath sounds: Normal breath sounds. No wheezing. Abdominal:      General: Bowel sounds are normal. There is no distension. Palpations: Abdomen is soft. There is no mass. Tenderness: There is no abdominal tenderness. Musculoskeletal: Normal range of motion. General: Tenderness present. Right lower leg: No edema. Left lower leg: No edema. Comments: Tenderness noted lateral epicondyle bilaterally. Full range of motion. Phalen's, Tinsel's negative. Skin:     General: Skin is warm and dry. Findings: No erythema or rash. Neurological:      Mental Status: He is alert and oriented to person, place, and time. Cranial Nerves: No cranial nerve deficit. Psychiatric:         Mood and Affect: Mood normal.         Behavior: Behavior normal.         Thought Content: Thought content normal.         Judgment: Judgment normal.         Lab Results   Component Value Date    WBC 3.2 (L) 08/07/2020    HGB 14.4 08/07/2020    HCT 42.9 08/07/2020     08/07/2020    CHOL 210 (H) 08/07/2020    TRIG 90 08/07/2020    HDL 65 08/07/2020    LDLCALC 127 08/07/2020     08/07/2020    K 4.0 08/07/2020     08/07/2020    CREATININE 1.1 08/07/2020    BUN 15 08/07/2020    CO2 26 08/07/2020    LABA1C 5.6 08/27/2020    LABGLOM 73 (A) 08/07/2020    CALCIUM 9.3 08/07/2020       Imaging Results:    No results found. Assessment / Plan:     Dannette Saint was seen today for follow-up and pain. Diagnoses and all orders for this visit:    Lateral epicondylitis of both elbows:  - Will try exercises provided  - NSAIDs for pain  - Ice/heat   - Call if worsening    Hemangioma of lip:  - Path results reviewed with patient - Sections show a benign lobular capillary hemangioma. There is no evidence of malignancy.      Other orders  -     POCT glycosylated hemoglobin (Hb A1C)      Diabetes screen - A1c 5.6%  Discussed Flu shot will be available starting in October  Not sure about previous vaccination status with Pneumox, Tdap    Will plan to repeat labs in 2-3months - WBC decreased. Return in about 1 year (around 8/27/2021). Medications Prescribed:  No orders of the defined types were placed in this encounter. No future appointments. Patient given educational materials - see patient instructions. Discussed use, benefit, and sideeffects of prescribed medications. All patient questions answered. Pt voiced understanding. Reviewed health maintenance. Instructed to continue current medications, diet and exercise. Patient agreed with treatment plan. Follow up as directed.      Electronically signed by Gera Hernandez MD on 8/27/2020 at 4:09 PM

## 2020-08-27 NOTE — PROGRESS NOTES
[id number]    No results found for: TSH, Z1OEKZS, P1RNVEZ, THYROIDAB, FT3, T4FREE    Lab Results   Component Value Date    WBC 3.2 (L) 08/07/2020    HGB 14.4 08/07/2020    HCT 42.9 08/07/2020    MCV 88.1 08/07/2020     08/07/2020           There is no immunization history on file for this patient. Health Maintenance   Topic Date Due    Diabetes screen  05/30/2018    DTaP/Tdap/Td vaccine (1 - Tdap) 08/27/2021 (Originally 5/30/1997)    Pneumococcal 0-64 years Vaccine (1 of 1 - PPSV23) 08/27/2021 (Originally 5/30/1984)    Flu vaccine (1) 09/01/2020    Lipid screen  08/07/2025    HIV screen  Completed    Hepatitis A vaccine  Aged Out    Hepatitis B vaccine  Aged Out    Hib vaccine  Aged Out    Meningococcal (ACWY) vaccine  Aged Out       No future appointments. ASSESSMENT       Diagnosis Orders   1. Lateral epicondylitis of both elbows     2. Hemangioma of lip         PLAN      After discussion with pt and  , we agreed on plan as follows:    Encouraged annual FLU VACCINE after October 1st.  Encouraged TETANUS SHOT (TdaP/ ADACEL/ [address]) per personal pharmacy if not done in past 10 years. Encouraged PNEUMOVAX 23 at this time  Exercises provided per Dr. Carol Espinoza in PM/ Heat in AM- 20 minutes on, 1 hour off. NSAID's prn  Consider repeat CBC in future to follow up on Leukopenia. Follow up annually. Attending Physician Statement  I have discussed the case, including pertinent history and exam findings with the resident. I agree with the documented assessment and plan as documented by the resident.   GE modifier added  to this encounter     Electronically signed by Almas Edwards MD on 8/27/2020 at 9:34 PM

## 2020-09-08 ASSESSMENT — ENCOUNTER SYMPTOMS
SINUS PAIN: 0
COLOR CHANGE: 0
SINUS PRESSURE: 0
PHOTOPHOBIA: 0
SHORTNESS OF BREATH: 0
TROUBLE SWALLOWING: 0
ABDOMINAL PAIN: 0
FACIAL SWELLING: 0
COUGH: 0

## 2020-09-08 ASSESSMENT — VISUAL ACUITY: OU: 1

## 2020-09-08 NOTE — PROGRESS NOTES
[facility] PLASTIC SURGERY  [address]  [address]. [facility] [address]  Dept: [phone]  Dept Fax: [phone]: [phone]     Visit Date:  8/18/2020    Patient:  Balta Lara  YOB: 1980    HPI:   Balta Lara presents today for No chief complaint on file. Moses Green is a 20-year-old male who is referred to my office by his primary care provider for evaluation of a lesion on his lower lip. Patient states it has been present for more than 6 months and slowly increasing in size. He does state that he occasionally bleeds. He has no history of trauma to this area. He has no history of skin cancer. He denies any pain, numbness, bleeding, drainage, or tenderness to this area. Medications  No current outpatient medications on file. Allergies:  has No Known Allergies. Past Medical History:   has no past medical history on file. Past Surgical History   has no past surgical history on file. Family History  family history includes High Blood Pressure in his father and mother. Social History   reports that he has been smoking cigars. He has never used smokeless tobacco. He reports current alcohol use. Health Maintenance:    Health Maintenance   Topic Date Due    Flu vaccine (1) 09/01/2020    DTaP/Tdap/Td vaccine (1 - Tdap) 08/27/2021 (Originally 5/30/1997)    Pneumococcal 0-64 years Vaccine (1 of 1 - PPSV23) 08/27/2021 (Originally 5/30/1984)    Diabetes screen  08/27/2023    Lipid screen  08/07/2025    HIV screen  Completed    Hepatitis A vaccine  Aged Out    Hepatitis B vaccine  Aged Out    Hib vaccine  Aged Out    Meningococcal (ACWY) vaccine  Aged Out       Subjective:      Review of Systems   Constitutional: Negative for activity change, appetite change, fatigue, fever and unexpected weight change.    HENT: Negative for dental problem, facial swelling, nosebleeds, sinus pressure, sinus pain and trouble General: Abdomen is flat. Bowel sounds are normal. There is no distension. Palpations: Abdomen is soft. Tenderness: There is no abdominal tenderness. Musculoskeletal: Normal range of motion. General: No deformity. Skin:     General: Skin is warm. Capillary Refill: Capillary refill takes less than 2 seconds. Findings: No erythema or rash. Neurological:      General: No focal deficit present. Mental Status: He is alert and oriented to person, place, and time. Mental status is at baseline. Sensory: No sensory deficit. Psychiatric:         Mood and Affect: Mood normal.         Behavior: Behavior normal. Behavior is cooperative. Thought Content: Thought content normal.         Judgment: Judgment normal.       Physical Exam   EENT              Assessment/Plan:   Assessment: Lesion of the lower lip, possible hemangioma  Plan: Biopsy of lesion lower lip    Procedure: After informed consent was obtained listing risk and benefits of the procedure, area of the lower lip was prepped and draped in usual aseptic manner local infiltration mixture 1% lidocaine with epinephrine was infiltrated to the planned site of biopsy. Once adequate anesthesia and epinephrine effect had occurred, a 15 blade scalpel was used to remove a portion of the lesion, this was placed in formalin and sent for permanent section. The biopsy site was cauterized with the Hyfrecator and a dressing was placed. The patient tolerated the procedure well. The encounter diagnosis was Neoplasm of uncertain behavior of skin.   Orders Placed This Encounter   Procedures    Surgical Pathology     Standing Status:   Future     Standing Expiration Date:   8/18/2021     Order Specific Question:   PREVIOUS BIOPSY     Answer:   No     Order Specific Question:   PREOP DIAGNOSIS     Answer:   neoplasm     Order Specific Question:   FROZEN SECTION - NO OR YES/SPECIMEN     Answer:   No    Surgical Pathology    Surgical Pathology        No follow-ups on file. Patient given educational materials - see patient instructions. Discussed use, benefit, and side effects of prescribed medications. All patient questions answered. Pt voiced understanding. Reviewed health maintenance.        Electronically signed Luciana Paredes MD on 8/18/2020 at 1:37 PM EDT

## 2023-11-08 ENCOUNTER — OFFICE VISIT (OUTPATIENT)
Dept: FAMILY MEDICINE CLINIC | Age: 43
End: 2023-11-08

## 2023-11-08 VITALS
HEART RATE: 84 BPM | BODY MASS INDEX: 28.8 KG/M2 | SYSTOLIC BLOOD PRESSURE: 116 MMHG | HEIGHT: 74 IN | OXYGEN SATURATION: 98 % | DIASTOLIC BLOOD PRESSURE: 72 MMHG | TEMPERATURE: 98.5 F | RESPIRATION RATE: 16 BRPM | WEIGHT: 224.4 LBS

## 2023-11-08 DIAGNOSIS — Z72.89 OTHER PROBLEMS RELATED TO LIFESTYLE: ICD-10-CM

## 2023-11-08 DIAGNOSIS — Z13.220 SCREENING FOR HYPERLIPIDEMIA: ICD-10-CM

## 2023-11-08 DIAGNOSIS — Z11.59 NEED FOR HEPATITIS C SCREENING TEST: ICD-10-CM

## 2023-11-08 DIAGNOSIS — Z00.00 ENCOUNTER FOR WELL ADULT EXAM WITHOUT ABNORMAL FINDINGS: Primary | ICD-10-CM

## 2023-11-08 PROBLEM — S62.608A: Status: ACTIVE | Noted: 2023-11-08

## 2023-11-08 SDOH — ECONOMIC STABILITY: FOOD INSECURITY: WITHIN THE PAST 12 MONTHS, THE FOOD YOU BOUGHT JUST DIDN'T LAST AND YOU DIDN'T HAVE MONEY TO GET MORE.: NEVER TRUE

## 2023-11-08 SDOH — ECONOMIC STABILITY: HOUSING INSECURITY
IN THE LAST 12 MONTHS, WAS THERE A TIME WHEN YOU DID NOT HAVE A STEADY PLACE TO SLEEP OR SLEPT IN A SHELTER (INCLUDING NOW)?: NO

## 2023-11-08 SDOH — ECONOMIC STABILITY: INCOME INSECURITY: HOW HARD IS IT FOR YOU TO PAY FOR THE VERY BASICS LIKE FOOD, HOUSING, MEDICAL CARE, AND HEATING?: NOT HARD AT ALL

## 2023-11-08 SDOH — ECONOMIC STABILITY: FOOD INSECURITY: WITHIN THE PAST 12 MONTHS, YOU WORRIED THAT YOUR FOOD WOULD RUN OUT BEFORE YOU GOT MONEY TO BUY MORE.: NEVER TRUE

## 2023-11-08 ASSESSMENT — PATIENT HEALTH QUESTIONNAIRE - PHQ9
SUM OF ALL RESPONSES TO PHQ QUESTIONS 1-9: 0
2. FEELING DOWN, DEPRESSED OR HOPELESS: 0
SUM OF ALL RESPONSES TO PHQ9 QUESTIONS 1 & 2: 0
SUM OF ALL RESPONSES TO PHQ QUESTIONS 1-9: 0
1. LITTLE INTEREST OR PLEASURE IN DOING THINGS: 0

## 2023-11-08 NOTE — PROGRESS NOTES
Attending Physician Note    I reviewed the patient's medical history, the resident's findings on physical examination, the patient's diagnoses, and treatment plan as documented in the resident note. I concur with the treatment plan as documented. Any additional suggestions noted below. GE modifier    Brief summary:   Wellness - check cscope. Labs ordered. Encouraged flu vaccine, COVID vaccine. Smokes cigars - rec cut back.

## 2023-11-08 NOTE — PATIENT INSTRUCTIONS
pressure that is not controlled with medicine, a history of irregular heartbeat, or you have been prescribed medicine to help you quit smoking. 5. BE PREPARED FOR RELAPSE OR DIFFICULT SITUATIONS  Most relapses occur within the first 3 months after quitting. Do not be discouraged if you start smoking again. Remember, most people try several times before they finally quit. You may have symptoms of withdrawal because your body is used to nicotine. You may crave cigarettes, be irritable, feel very hungry, cough often, get headaches, or have difficulty concentrating. The withdrawal symptoms are only temporary. They are strongest when you first quit, but they will go away within 10 to 14 days. Here are some difficult situations to watch for:  Alcohol. Avoid drinking alcohol. Drinking lowers your chances of successfully quitting. Caffeine. Try to reduce the amount of caffeine you consume. It also lowers your chances of successfully quitting. Other smokers. Being around smoking can make you want to smoke. Avoid smokers. Weight gain. Many smokers will gain weight when they quit, usually less than 10 pounds. Eat a healthy diet and stay active. Do not let weight gain distract you from your main goal, quitting smoking. Some medicines that help you quit smoking may also help delay weight gain. You can always lose the weight gained after you quit. Bad mood or depression. There are a lot of ways to improve your mood other than smoking. If you are having problems with any of these situations, talk to your caregiver. SPECIAL SITUATIONS AND CONDITIONS  Studies suggest that everyone can quit smoking. Your situation or condition can give you a special reason to quit. Pregnant women/new mothers: By quitting, you protect your baby's health and your own. Hospitalized patients: By quitting, you reduce health problems and help healing. Heart attack patients: By quitting, you reduce your risk of a second heart attack.   Lung,

## 2023-11-10 ENCOUNTER — NURSE ONLY (OUTPATIENT)
Dept: LAB | Age: 43
End: 2023-11-10

## 2023-11-10 DIAGNOSIS — Z72.89 OTHER PROBLEMS RELATED TO LIFESTYLE: ICD-10-CM

## 2023-11-10 DIAGNOSIS — Z00.00 ENCOUNTER FOR WELL ADULT EXAM WITHOUT ABNORMAL FINDINGS: ICD-10-CM

## 2023-11-10 DIAGNOSIS — Z13.220 SCREENING FOR HYPERLIPIDEMIA: ICD-10-CM

## 2023-11-10 DIAGNOSIS — Z11.59 NEED FOR HEPATITIS C SCREENING TEST: ICD-10-CM

## 2023-11-10 LAB
ALBUMIN SERPL BCG-MCNC: 4.2 G/DL (ref 3.5–5.1)
ALP SERPL-CCNC: 64 U/L (ref 38–126)
ALT SERPL W/O P-5'-P-CCNC: 18 U/L (ref 11–66)
ANION GAP SERPL CALC-SCNC: 9 MEQ/L (ref 8–16)
AST SERPL-CCNC: 21 U/L (ref 5–40)
BASOPHILS ABSOLUTE: 0 THOU/MM3 (ref 0–0.1)
BASOPHILS NFR BLD AUTO: 0.3 %
BILIRUB SERPL-MCNC: 0.7 MG/DL (ref 0.3–1.2)
BUN SERPL-MCNC: 17 MG/DL (ref 7–22)
CALCIUM SERPL-MCNC: 9.4 MG/DL (ref 8.5–10.5)
CHLORIDE SERPL-SCNC: 106 MEQ/L (ref 98–111)
CHOLESTEROL, FASTING: 228 MG/DL (ref 100–199)
CO2 SERPL-SCNC: 26 MEQ/L (ref 23–33)
CREAT SERPL-MCNC: 1.2 MG/DL (ref 0.4–1.2)
DEPRECATED MEAN GLUCOSE BLD GHB EST-ACNC: 114 MG/DL (ref 70–126)
DEPRECATED RDW RBC AUTO: 41 FL (ref 35–45)
EOSINOPHIL NFR BLD AUTO: 3.4 %
EOSINOPHILS ABSOLUTE: 0.1 THOU/MM3 (ref 0–0.4)
ERYTHROCYTE [DISTWIDTH] IN BLOOD BY AUTOMATED COUNT: 12.5 % (ref 11.5–14.5)
GFR SERPL CREATININE-BSD FRML MDRD: > 60 ML/MIN/1.73M2
GLUCOSE SERPL-MCNC: 109 MG/DL (ref 70–108)
HBA1C MFR BLD HPLC: 5.8 % (ref 4.4–6.4)
HCT VFR BLD AUTO: 43.2 % (ref 42–52)
HCV IGG SERPL QL IA: NEGATIVE
HDLC SERPL-MCNC: 67 MG/DL
HGB BLD-MCNC: 14.7 GM/DL (ref 14–18)
IMM GRANULOCYTES # BLD AUTO: 0.01 THOU/MM3 (ref 0–0.07)
IMM GRANULOCYTES NFR BLD AUTO: 0.3 %
LDLC SERPL CALC-MCNC: 137 MG/DL
LYMPHOCYTES ABSOLUTE: 1.3 THOU/MM3 (ref 1–4.8)
LYMPHOCYTES NFR BLD AUTO: 43.8 %
MCH RBC QN AUTO: 30.3 PG (ref 26–33)
MCHC RBC AUTO-ENTMCNC: 34 GM/DL (ref 32.2–35.5)
MCV RBC AUTO: 89.1 FL (ref 80–94)
MONOCYTES ABSOLUTE: 0.3 THOU/MM3 (ref 0.4–1.3)
MONOCYTES NFR BLD AUTO: 9.2 %
NEUTROPHILS NFR BLD AUTO: 43 %
NRBC BLD AUTO-RTO: 0 /100 WBC
PLATELET # BLD AUTO: 210 THOU/MM3 (ref 130–400)
PMV BLD AUTO: 11.2 FL (ref 9.4–12.4)
POTASSIUM SERPL-SCNC: 3.8 MEQ/L (ref 3.5–5.2)
PROT SERPL-MCNC: 7.3 G/DL (ref 6.1–8)
RBC # BLD AUTO: 4.85 MILL/MM3 (ref 4.7–6.1)
SEGMENTED NEUTROPHILS ABSOLUTE COUNT: 1.2 THOU/MM3 (ref 1.8–7.7)
SODIUM SERPL-SCNC: 141 MEQ/L (ref 135–145)
T4 FREE SERPL-MCNC: 1.04 NG/DL (ref 0.93–1.76)
TRIGLYCERIDE, FASTING: 118 MG/DL (ref 0–199)
TSH SERPL DL<=0.005 MIU/L-ACNC: 1.86 UIU/ML (ref 0.4–4.2)
WBC # BLD AUTO: 2.9 THOU/MM3 (ref 4.8–10.8)

## 2023-11-14 ASSESSMENT — ENCOUNTER SYMPTOMS
SHORTNESS OF BREATH: 0
WHEEZING: 0
ABDOMINAL PAIN: 0
CHEST TIGHTNESS: 0

## 2023-12-01 ENCOUNTER — OFFICE VISIT (OUTPATIENT)
Dept: SURGERY | Age: 43
End: 2023-12-01
Payer: COMMERCIAL

## 2023-12-01 VITALS
WEIGHT: 227 LBS | DIASTOLIC BLOOD PRESSURE: 82 MMHG | HEART RATE: 86 BPM | SYSTOLIC BLOOD PRESSURE: 122 MMHG | OXYGEN SATURATION: 98 % | RESPIRATION RATE: 18 BRPM | HEIGHT: 74 IN | TEMPERATURE: 97.5 F | BODY MASS INDEX: 29.13 KG/M2

## 2023-12-01 DIAGNOSIS — Z12.12 SCREENING FOR COLORECTAL CANCER: Primary | ICD-10-CM

## 2023-12-01 DIAGNOSIS — Z12.11 SCREENING FOR COLORECTAL CANCER: Primary | ICD-10-CM

## 2023-12-01 PROCEDURE — S0285 CNSLT BEFORE SCREEN COLONOSC: HCPCS | Performed by: SURGERY

## 2023-12-01 NOTE — PROGRESS NOTES
Kenn Valderrama. Anthony, [address]  [phone]  New Patient Evaluation in Office    Pt Name: Juancarlos Oneal  Date of Birth 1980   Today's Date: 12/1/2023  Medical Record Number: [id number]  Referring Provider: Fredirick Nyhan, MD  Primary Care Provider: Fredirick Nyhan, MD  Chief Complaint   Patient presents with    Surgical Consult     NP-- Ref Dr Hoa Harmon,  Screening Colonoscopy     ASSESSMENT       Diagnosis Orders   1. Screening for colorectal cancer  COLONOSCOPY W/ OR W/O BIOPSY      History reviewed. No pertinent past medical history. PLANS      Schedule Surendra Sins for colonoscopy with possible biopsy/polypectomy  Potential diagnostic/therapeutic options and alternatives were discussed with the patient in the office. Potential risks of bleeding, infection, perforation and missed lesions was reviewed. Potential for biopsy and/or polypectomy was discussed. We also discussed the use of IV sedation and colon preparation instructions. The patient was given an opportunity to ask questions. Once answered, the patient was agreeable to proceed with the procedure. Status: Outpatient in endoscopy  Planned anesthesia: IV sedation provided by myself  Perioperative discontinuation of ASA, Effient, and Jeaneen Rouge. Perioperative bowel preparation with Miralax and Dulcolax. Instructions given and questions answered. Angeline Short is a 37 y.o. male seen in the consultation for evaluation regarding colonoscopy. He denies any history of previous adenomatous polyp, previous colon cancer, family history of colon cancer, rectal bleeding, melena, iron deficiency anemia, chronic abdominal pain, change in bowel habits, unexplained weight loss. Past Medical History  History reviewed. No pertinent past medical history. Past Surgical History  History reviewed. No pertinent surgical history.   Medications  No current outpatient medications

## 2023-12-08 ENCOUNTER — PREP FOR PROCEDURE (OUTPATIENT)
Dept: SURGERY | Age: 43
End: 2023-12-08

## 2023-12-08 RX ORDER — SODIUM CHLORIDE 9 MG/ML
INJECTION, SOLUTION INTRAVENOUS PRN
Status: CANCELLED | OUTPATIENT
Start: 2023-12-08

## 2023-12-08 RX ORDER — SODIUM CHLORIDE 450 MG/100ML
INJECTION, SOLUTION INTRAVENOUS CONTINUOUS
Status: CANCELLED | OUTPATIENT
Start: 2023-12-08

## 2023-12-08 RX ORDER — SODIUM CHLORIDE 0.9 % (FLUSH) 0.9 %
5-40 SYRINGE (ML) INJECTION PRN
Status: CANCELLED | OUTPATIENT
Start: 2023-12-08

## 2023-12-08 RX ORDER — SODIUM CHLORIDE 0.9 % (FLUSH) 0.9 %
5-40 SYRINGE (ML) INJECTION EVERY 12 HOURS SCHEDULED
Status: CANCELLED | OUTPATIENT
Start: 2023-12-08

## 2023-12-11 RX ORDER — SODIUM CHLORIDE 0.9 % (FLUSH) 0.9 %
5-40 SYRINGE (ML) INJECTION EVERY 12 HOURS SCHEDULED
Status: DISCONTINUED | OUTPATIENT
Start: 2023-12-11 | End: 2023-12-14 | Stop reason: HOSPADM

## 2023-12-11 RX ORDER — SODIUM CHLORIDE 450 MG/100ML
INJECTION, SOLUTION INTRAVENOUS CONTINUOUS
Status: DISCONTINUED | OUTPATIENT
Start: 2023-12-11 | End: 2023-12-14 | Stop reason: HOSPADM

## 2023-12-11 RX ORDER — SODIUM CHLORIDE 9 MG/ML
INJECTION, SOLUTION INTRAVENOUS PRN
Status: DISCONTINUED | OUTPATIENT
Start: 2023-12-11 | End: 2023-12-14 | Stop reason: HOSPADM

## 2023-12-11 RX ORDER — SODIUM CHLORIDE 0.9 % (FLUSH) 0.9 %
5-40 SYRINGE (ML) INJECTION PRN
Status: DISCONTINUED | OUTPATIENT
Start: 2023-12-11 | End: 2023-12-14 | Stop reason: HOSPADM

## 2023-12-13 NOTE — DISCHARGE INSTRUCTIONS
ENDOSCOPY DISCHARGE INSTRUCTION SHEET  EGD/COLONOSCOPY  Alvina Contreras D.O. [address] [place] [address]  12/14/2023    Call your doctor at [phone] if any of the following symptoms occur at anytime   *Excessive pain - a few cramps are to be expected   *Temperature above 100 degrees    *Excessive bleeding or large clots of blood. Don't worry about a few specks of blood. *Repeated nausea and vomiting. 2.    Follow colonoscopy recommended in 10 years. [address] [facility]  [place], [address] [place] [address] Real  Electronically signed by Alvina Contreras DO on 12/14/2023 at 8:13 AM

## 2023-12-13 NOTE — OP NOTE
[facility]  RECORD OF OPERATION  PATIENT NAME: [address] [facility] RECORD NO. [id number]  SURGEON: REGINA Yu  PRIMARY CARE PHYSICIAN: Luz Elena Alvarado MD     PROCEDURE PERFORMED:  12/14/2023   PREOPERATIVE DIAGNOSIS:  Pre-Op Diagnosis Codes:     * Encounter for screening colonoscopy [Z12.11]   POSTOPERATIVE DIAGNOSIS:  Unremarkable colonoscopy  PROCEDURE PERFORMED:  Colonoscopy   ANESTHESIA:  IV sedation with 100 mcg Fentanyl and 7.5 mg Versed  ESTIMATED BLOOD LOSS:  None. SPECIMENS: None  COMPLICATIONS:  None immediately appreciated. DISCUSSION:  Lizz Kang is a 37y.o.-year-old male who presented to my office and seen in evaluation at request of Luz Elena Alvarado MD. After history and physical examination was performed, potential diagnostic and therapeutic modalities discussed with the patient. Radiographic and endoscopic studies were discussed, risks, complications, benefits reviewed. He was given opportunity to ask questions, once answeredinformed consent was obtained. The patient was the Endoscopy Suite on 12/14/2023 for procedure. OPERATIVE FINDINGS:  At the time of endoscopy good prep appreciated. Scope was able to be advanced to level of cecum. There was no evidence of intraluminal or mucosal pathology. PROCEDURE:  The patient was brought to endoscopy suite, placed in left lateral Viera position, placed under continuous cardiac telemetry, blood pressure, pulse oximetry monitoring, placed under IV sedation with medications noted above, done in incremental fashion to achieve sedation. Digital rectal exam performed revealing adequate rectal tone, no masses palpable. Colonoscope advanced to rectum, rectum gently insufflated. Under direct visualization colonoscope was advanced entirety of colon to level of cecum, confirmed by ileocecal valve, triangle folds, appendiceal orifice. The scope was slowly retracted, intraluminal structures inspected.  The scope was retracted

## 2023-12-13 NOTE — H&P
Formulation and discussion of sedation/procedure plan, risks, and expectations with patient and/or responsible adult completed. 5. Patient examined immediately prior to the procedure.  (Refer to nursing sedation/analgesia documentation record)      Electronically signed by Daniela Chavira DO on 12/14/2023 at 7:32 AM

## 2023-12-14 ENCOUNTER — HOSPITAL ENCOUNTER (OUTPATIENT)
Age: 43
Setting detail: OUTPATIENT SURGERY
Discharge: HOME | End: 2023-12-14
Attending: SURGERY | Admitting: SURGERY
Payer: COMMERCIAL

## 2023-12-14 VITALS
DIASTOLIC BLOOD PRESSURE: 88 MMHG | OXYGEN SATURATION: 95 % | RESPIRATION RATE: 16 BRPM | WEIGHT: 223 LBS | BODY MASS INDEX: 28.62 KG/M2 | HEIGHT: 74 IN | TEMPERATURE: 96.2 F | HEART RATE: 79 BPM | SYSTOLIC BLOOD PRESSURE: 136 MMHG

## 2023-12-14 DIAGNOSIS — Z12.12 SCREENING FOR COLORECTAL CANCER: ICD-10-CM

## 2023-12-14 DIAGNOSIS — Z12.11 SCREENING FOR COLORECTAL CANCER: ICD-10-CM

## 2023-12-14 PROCEDURE — 3609027000 HC COLONOSCOPY: Performed by: SURGERY

## 2023-12-14 PROCEDURE — 2580000003 HC RX 258: Performed by: SURGERY

## 2023-12-14 PROCEDURE — 2709999900 HC NON-CHARGEABLE SUPPLY: Performed by: SURGERY

## 2023-12-14 PROCEDURE — 7100000010 HC PHASE II RECOVERY - FIRST 15 MIN: Performed by: SURGERY

## 2023-12-14 PROCEDURE — 6360000002 HC RX W HCPCS: Performed by: SURGERY

## 2023-12-14 RX ORDER — FENTANYL CITRATE 50 UG/ML
INJECTION, SOLUTION INTRAMUSCULAR; INTRAVENOUS PRN
Status: DISCONTINUED | OUTPATIENT
Start: 2023-12-14 | End: 2023-12-14 | Stop reason: ALTCHOICE

## 2023-12-14 RX ORDER — MIDAZOLAM HYDROCHLORIDE 1 MG/ML
INJECTION INTRAMUSCULAR; INTRAVENOUS PRN
Status: DISCONTINUED | OUTPATIENT
Start: 2023-12-14 | End: 2023-12-14 | Stop reason: ALTCHOICE

## 2023-12-14 RX ADMIN — SODIUM CHLORIDE: 9 INJECTION, SOLUTION INTRAVENOUS at 07:28

## 2023-12-14 ASSESSMENT — PAIN - FUNCTIONAL ASSESSMENT
PAIN_FUNCTIONAL_ASSESSMENT: 0-10
PAIN_FUNCTIONAL_ASSESSMENT: NONE - DENIES PAIN
PAIN_FUNCTIONAL_ASSESSMENT: NONE - DENIES PAIN

## 2023-12-14 NOTE — PROGRESS NOTES
Recovery mode. Denies discomfort, passing gas, taking fluids. Dr. Danny Mcgregor discussed findings and plan of care with patient and . Discharge instructions provided, understanding verbalized.

## 2024-03-05 ASSESSMENT — PATIENT HEALTH QUESTIONNAIRE - PHQ9
SUM OF ALL RESPONSES TO PHQ9 QUESTIONS 1 & 2: 0
1. LITTLE INTEREST OR PLEASURE IN DOING THINGS: NOT AT ALL
2. FEELING DOWN, DEPRESSED OR HOPELESS: NOT AT ALL
1. LITTLE INTEREST OR PLEASURE IN DOING THINGS: 0
SUM OF ALL RESPONSES TO PHQ9 QUESTIONS 1 & 2: 0
SUM OF ALL RESPONSES TO PHQ QUESTIONS 1-9: 0
2. FEELING DOWN, DEPRESSED OR HOPELESS: 0

## 2024-03-08 ENCOUNTER — OFFICE VISIT (OUTPATIENT)
Dept: FAMILY MEDICINE CLINIC | Age: 44
End: 2024-03-08

## 2024-03-08 VITALS
RESPIRATION RATE: 14 BRPM | HEART RATE: 86 BPM | WEIGHT: 226.8 LBS | TEMPERATURE: 97.7 F | SYSTOLIC BLOOD PRESSURE: 126 MMHG | OXYGEN SATURATION: 96 % | BODY MASS INDEX: 29.11 KG/M2 | HEIGHT: 74 IN | DIASTOLIC BLOOD PRESSURE: 84 MMHG

## 2024-03-08 DIAGNOSIS — E78.00 ELEVATED CHOLESTEROL: ICD-10-CM

## 2024-03-08 DIAGNOSIS — G47.30 SLEEP APNEA, UNSPECIFIED TYPE: Primary | ICD-10-CM

## 2024-03-08 NOTE — PROGRESS NOTES
Attending Physician Note    I reviewed the patient's medical history, the resident's findings on physical examination, the patient's diagnoses, and treatment plan as documented in the resident note.  I concur with the treatment plan as documented.  Any additional suggestions noted below.    GE modifier    Brief summary:   Fatigue, snoring - high likelihood of sleep apnea.  Refer for testing.  HLD - ASCVD risk below threshold for treatment with medication.  Work on lifestyle modification and follow.

## 2024-03-08 NOTE — PROGRESS NOTES
Patient:Valentino Wilder  YOB: 1980  MRN: [id number]    Subjective   43 y.o. male who presents for the following: Sleep Apnea (Pt states he snores a lot and stops breathing in his sleep, and sometimes has choking in his sleep. )    HPI  Here for sleep apnea, fiance states he snpores and colonoscopy in December had a hard time waking up from anesthesia     Sleep medicine HPI/Evaluation:    Usual time to go to bed during the work/regular day of week: 11 pm  Usual time to wake up during the work//regular day of week: 6 am    Sleep apnea symptoms:  Noticed to have loud snoring:Yes. Noted by his family member- significant other  Witnessed apneas during sleep noticed: Yes. Noted by his family member- significant other  History of choking and gasping sensation at night time: Yes.   History of headaches in the morning:No.   History of dry mouth in the morning: Yes, sometimes   History of palpitations during night time/nocturnal awakenings: No.   History of sweating during night time/nocturnal awakenings: No.      General:  History of head injury in the past: No.   History of seizures: No  Rest less legs syndrome symptoms:NO  History suggestive of periodic limb movements during sleep: NO  History suggestive of hypnagogic hallucinations: NO  History suggestive of hypnopompic hallucinations: NO  History suggestive of sleep talking:NO  History suggestive of sleep walking:NO  History suggestive of bruxism: NO  [x] No mouth guard.  [] Uses mouth guard.    History suggestive of cataplexy: NO  History suggestive of sleep paralysis: NO    History regarding old sleep studies:  Prior history of sleep study: No.  Using CPAP device: No.  Currently using home Oxygen: NO.       Patient considerations:  Is the patient is ambulatory: Yes  Patient is currently using: None of these Wheelchair, Walker or Cane.  Para/Quadriplegic: NO  Hearing deficit : NO  Claustrophobic: NO  MDD : NO  Blind: NO  Incontinent: NO  Para/Quadraplegi:

## 2024-11-13 ENCOUNTER — OFFICE VISIT (OUTPATIENT)
Dept: FAMILY MEDICINE CLINIC | Age: 44
End: 2024-11-13

## 2024-11-13 VITALS
HEIGHT: 74 IN | OXYGEN SATURATION: 97 % | HEART RATE: 87 BPM | RESPIRATION RATE: 16 BRPM | TEMPERATURE: 98.5 F | WEIGHT: 219.6 LBS | SYSTOLIC BLOOD PRESSURE: 128 MMHG | DIASTOLIC BLOOD PRESSURE: 88 MMHG | BODY MASS INDEX: 28.18 KG/M2

## 2024-11-13 DIAGNOSIS — G47.30 SLEEP APNEA, UNSPECIFIED TYPE: Primary | ICD-10-CM

## 2024-11-13 DIAGNOSIS — N52.8 OTHER MALE ERECTILE DYSFUNCTION: ICD-10-CM

## 2024-11-13 DIAGNOSIS — R53.83 OTHER FATIGUE: ICD-10-CM

## 2024-11-13 DIAGNOSIS — Z00.00 WELLNESS EXAMINATION: ICD-10-CM

## 2024-11-13 RX ORDER — SILDENAFIL 50 MG/1
50 TABLET, FILM COATED ORAL DAILY PRN
Qty: 10 TABLET | Refills: 0 | Status: SHIPPED | OUTPATIENT
Start: 2024-11-13

## 2024-11-13 NOTE — PROGRESS NOTES
Patient:Valentino Wilder  YOB: 1980  MRN: [id number]    Subjective   44 y.o. male who presents for the following: Annual Exam (Patient in office today for Annual Physical. /)  Last seen on 3/8/24.  Here today for yearly physical    Patient states since he was not able to complete the sleep study but is still having difficulty with sleep apnea.  Patient endorsed that he follows up with eye doctor and dentist yearly.  Still had to get labs completed from last visit.  Denies any other concerns or complaints at this time.  HPI  Review of Systems   Review of Systems  Patient History    Past Medical History:  He has no past medical history on file.    Social History:  He reports that he has been smoking cigars. He has never used smokeless tobacco. He reports current alcohol use.     Past Surgical History:   Procedure Laterality Date    COLONOSCOPY N/A 12/14/2023    COLONOSCOPY performed by Farhat Cruz DO at [facility]    TOOTH EXTRACTION        Family History   Problem Relation Age of Onset    High Blood Pressure Mother     High Blood Pressure Father      Objective     Vitals:    11/13/24 1548   BP: 128/88   Site: Left Upper Arm   Position: Sitting   Cuff Size: Large Adult   Pulse: 87   Resp: 16   Temp: 98.5 °F (36.9 °C)   TempSrc: Oral   SpO2: 97%   Weight: 99.6 kg (219 lb 9.6 oz)   Height: 1.88 m (6' 2\")   Body mass index is 28.19 kg/m².    Physical Exam  Vitals reviewed.   Constitutional:       General: He is not in acute distress.     Appearance: Normal appearance. He is not ill-appearing.   HENT:      Head: Normocephalic and atraumatic.      Right Ear: External ear normal.      Left Ear: External ear normal.      Nose: Nose normal.   Eyes:      General:         Right eye: No discharge.         Left eye: No discharge.      Conjunctiva/sclera: Conjunctivae normal.   Cardiovascular:      Rate and Rhythm: Normal rate and regular rhythm.      Pulses: Normal pulses.      Heart sounds: Normal heart

## 2024-11-13 NOTE — PROGRESS NOTES
S: 44 y.o. male with   Chief Complaint   Patient presents with    Annual Exam     Patient in office today for Annual Physical.          Reviewed hx and ROS in detail with resident prior to exam.  See notes for additional details.     BP Readings from Last 3 Encounters:   11/13/24 128/88   03/08/24 126/84   12/14/23 136/88     Wt Readings from Last 3 Encounters:   11/13/24 99.6 kg (219 lb 9.6 oz)   03/08/24 102.9 kg (226 lb 12.8 oz)   12/14/23 101.2 kg (223 lb)           O: VS:  height is 1.88 m (6' 2\") and weight is 99.6 kg (219 lb 9.6 oz). His oral temperature is 98.5 °F (36.9 °C). His blood pressure is 128/88 and his pulse is 87. His respiration is 16 and oxygen saturation is 97%.   AAO/NAD, appropriate affect for mood      Health Maintenance Due   Topic Date Due    Pneumococcal 0-64 years Vaccine (1 of 2 - PCV) Never done    DTaP/Tdap/Td vaccine (6 - Tdap) 05/30/1989    Flu vaccine (1) Never done    COVID-19 Vaccine (3 - 2023-24 season) 09/01/2024    A1C test (Diabetic or Prediabetic)  11/10/2024         Attending Physician Statement  I have discussed the case, including pertinent history and exam findings with the resident.  I agree with the documented assessment and plan as documented by the resident.  GE modifier added to this encounter      Katelyn Ann Leopold, MD 11/13/2024 4:37 PM

## 2024-11-15 ENCOUNTER — LAB (OUTPATIENT)
Dept: LAB | Age: 44
End: 2024-11-15

## 2024-11-15 DIAGNOSIS — G47.30 SLEEP APNEA, UNSPECIFIED TYPE: ICD-10-CM

## 2024-11-15 DIAGNOSIS — Z00.00 WELLNESS EXAMINATION: ICD-10-CM

## 2024-11-15 DIAGNOSIS — R53.83 OTHER FATIGUE: ICD-10-CM

## 2024-11-15 LAB
ALBUMIN SERPL BCG-MCNC: 4.1 G/DL (ref 3.5–5.1)
ALP SERPL-CCNC: 69 U/L (ref 38–126)
ALT SERPL W/O P-5'-P-CCNC: 12 U/L (ref 11–66)
ANION GAP SERPL CALC-SCNC: 8 MEQ/L (ref 8–16)
AST SERPL-CCNC: 16 U/L (ref 5–40)
BASOPHILS ABSOLUTE: 0 THOU/MM3 (ref 0–0.1)
BASOPHILS NFR BLD AUTO: 0.7 %
BILIRUB SERPL-MCNC: 0.6 MG/DL (ref 0.3–1.2)
BUN SERPL-MCNC: 14 MG/DL (ref 7–22)
CALCIUM SERPL-MCNC: 9.4 MG/DL (ref 8.5–10.5)
CHLORIDE SERPL-SCNC: 105 MEQ/L (ref 98–111)
CHOLESTEROL, FASTING: 228 MG/DL (ref 100–199)
CO2 SERPL-SCNC: 27 MEQ/L (ref 23–33)
CREAT SERPL-MCNC: 1.1 MG/DL (ref 0.4–1.2)
DEPRECATED MEAN GLUCOSE BLD GHB EST-ACNC: 117 MG/DL (ref 70–126)
DEPRECATED RDW RBC AUTO: 39.8 FL (ref 35–45)
EOSINOPHIL NFR BLD AUTO: 2.4 %
EOSINOPHILS ABSOLUTE: 0.1 THOU/MM3 (ref 0–0.4)
ERYTHROCYTE [DISTWIDTH] IN BLOOD BY AUTOMATED COUNT: 12.5 % (ref 11.5–14.5)
GFR SERPL CREATININE-BSD FRML MDRD: 84 ML/MIN/1.73M2
GLUCOSE SERPL-MCNC: 98 MG/DL (ref 70–108)
HBA1C MFR BLD HPLC: 5.9 % (ref 4.4–6.4)
HCT VFR BLD AUTO: 44.6 % (ref 42–52)
HDLC SERPL-MCNC: 65 MG/DL
HGB BLD-MCNC: 15.2 GM/DL (ref 14–18)
IMM GRANULOCYTES # BLD AUTO: 0 THOU/MM3 (ref 0–0.07)
IMM GRANULOCYTES NFR BLD AUTO: 0 %
LDLC SERPL CALC-MCNC: 144 MG/DL
LYMPHOCYTES ABSOLUTE: 1.3 THOU/MM3 (ref 1–4.8)
LYMPHOCYTES NFR BLD AUTO: 44.3 %
MCH RBC QN AUTO: 29.9 PG (ref 26–33)
MCHC RBC AUTO-ENTMCNC: 34.1 GM/DL (ref 32.2–35.5)
MCV RBC AUTO: 87.6 FL (ref 80–94)
MONOCYTES ABSOLUTE: 0.4 THOU/MM3 (ref 0.4–1.3)
MONOCYTES NFR BLD AUTO: 12.5 %
NEUTROPHILS ABSOLUTE: 1.2 THOU/MM3 (ref 1.8–7.7)
NEUTROPHILS NFR BLD AUTO: 40.1 %
NRBC BLD AUTO-RTO: 0 /100 WBC
PLATELET # BLD AUTO: 219 THOU/MM3 (ref 130–400)
PMV BLD AUTO: 11.3 FL (ref 9.4–12.4)
POTASSIUM SERPL-SCNC: 4.1 MEQ/L (ref 3.5–5.2)
PROT SERPL-MCNC: 7.3 G/DL (ref 6.1–8)
RBC # BLD AUTO: 5.09 MILL/MM3 (ref 4.7–6.1)
SODIUM SERPL-SCNC: 140 MEQ/L (ref 135–145)
TRIGLYCERIDE, FASTING: 94 MG/DL (ref 0–199)
TSH SERPL DL<=0.005 MIU/L-ACNC: 2.7 UIU/ML (ref 0.4–4.2)
WBC # BLD AUTO: 3 THOU/MM3 (ref 4.8–10.8)

## 2025-02-12 DIAGNOSIS — N52.8 OTHER MALE ERECTILE DYSFUNCTION: ICD-10-CM

## 2025-02-12 RX ORDER — SILDENAFIL 50 MG/1
50 TABLET, FILM COATED ORAL DAILY PRN
Qty: 10 TABLET | Refills: 0 | Status: SHIPPED | OUTPATIENT
Start: 2025-02-12

## (undated) DEVICE — GLOVE ORTHO 8   MSG9480